# Patient Record
Sex: MALE | Race: WHITE | NOT HISPANIC OR LATINO | Employment: OTHER | URBAN - METROPOLITAN AREA
[De-identification: names, ages, dates, MRNs, and addresses within clinical notes are randomized per-mention and may not be internally consistent; named-entity substitution may affect disease eponyms.]

---

## 2017-01-31 ENCOUNTER — OFFICE VISIT (OUTPATIENT)
Dept: FAMILY MEDICINE | Facility: CLINIC | Age: 57
End: 2017-01-31
Payer: COMMERCIAL

## 2017-01-31 VITALS
TEMPERATURE: 97.6 F | WEIGHT: 186 LBS | HEIGHT: 71 IN | DIASTOLIC BLOOD PRESSURE: 80 MMHG | OXYGEN SATURATION: 99 % | BODY MASS INDEX: 26.04 KG/M2 | SYSTOLIC BLOOD PRESSURE: 134 MMHG | HEART RATE: 76 BPM

## 2017-01-31 DIAGNOSIS — Z01.818 PREOP GENERAL PHYSICAL EXAM: ICD-10-CM

## 2017-01-31 DIAGNOSIS — N17.9 AKI (ACUTE KIDNEY INJURY) (H): ICD-10-CM

## 2017-01-31 DIAGNOSIS — E78.5 HYPERLIPIDEMIA, UNSPECIFIED HYPERLIPIDEMIA TYPE: ICD-10-CM

## 2017-01-31 DIAGNOSIS — N20.0 NEPHROLITHIASIS: ICD-10-CM

## 2017-01-31 DIAGNOSIS — I10 HYPERTENSION GOAL BP (BLOOD PRESSURE) < 140/90: ICD-10-CM

## 2017-01-31 LAB
BASOPHILS # BLD AUTO: 0.1 10E9/L (ref 0–0.2)
BASOPHILS NFR BLD AUTO: 0.5 %
DIFFERENTIAL METHOD BLD: ABNORMAL
EOSINOPHIL # BLD AUTO: 0.2 10E9/L (ref 0–0.7)
EOSINOPHIL NFR BLD AUTO: 2.2 %
ERYTHROCYTE [DISTWIDTH] IN BLOOD BY AUTOMATED COUNT: 12.3 % (ref 10–15)
HCT VFR BLD AUTO: 39.9 % (ref 40–53)
HGB BLD-MCNC: 14.2 G/DL (ref 13.3–17.7)
LYMPHOCYTES # BLD AUTO: 1.9 10E9/L (ref 0.8–5.3)
LYMPHOCYTES NFR BLD AUTO: 18.6 %
MCH RBC QN AUTO: 31.4 PG (ref 26.5–33)
MCHC RBC AUTO-ENTMCNC: 35.6 G/DL (ref 31.5–36.5)
MCV RBC AUTO: 88 FL (ref 78–100)
MONOCYTES # BLD AUTO: 0.9 10E9/L (ref 0–1.3)
MONOCYTES NFR BLD AUTO: 8.8 %
NEUTROPHILS # BLD AUTO: 7.2 10E9/L (ref 1.6–8.3)
NEUTROPHILS NFR BLD AUTO: 69.9 %
PLATELET # BLD AUTO: 313 10E9/L (ref 150–450)
RBC # BLD AUTO: 4.52 10E12/L (ref 4.4–5.9)
WBC # BLD AUTO: 10.2 10E9/L (ref 4–11)

## 2017-01-31 PROCEDURE — 99215 OFFICE O/P EST HI 40 MIN: CPT | Performed by: FAMILY MEDICINE

## 2017-01-31 PROCEDURE — 85025 COMPLETE CBC W/AUTO DIFF WBC: CPT | Performed by: FAMILY MEDICINE

## 2017-01-31 PROCEDURE — 80048 BASIC METABOLIC PNL TOTAL CA: CPT | Performed by: FAMILY MEDICINE

## 2017-01-31 PROCEDURE — 36415 COLL VENOUS BLD VENIPUNCTURE: CPT | Performed by: FAMILY MEDICINE

## 2017-01-31 PROCEDURE — 80061 LIPID PANEL: CPT | Performed by: FAMILY MEDICINE

## 2017-01-31 PROCEDURE — 93000 ELECTROCARDIOGRAM COMPLETE: CPT | Performed by: FAMILY MEDICINE

## 2017-01-31 PROCEDURE — 82043 UR ALBUMIN QUANTITATIVE: CPT | Performed by: FAMILY MEDICINE

## 2017-01-31 RX ORDER — OXYCODONE HYDROCHLORIDE 5 MG/1
10 TABLET ORAL EVERY 6 HOURS PRN
Qty: 18 TABLET | Refills: 0 | COMMUNITY
Start: 2017-01-31 | End: 2018-03-27

## 2017-01-31 RX ORDER — TAMSULOSIN HYDROCHLORIDE 0.4 MG/1
CAPSULE ORAL DAILY
COMMUNITY
End: 2019-09-04

## 2017-01-31 RX ORDER — IBUPROFEN 100 MG/1
100 TABLET, CHEWABLE ORAL EVERY 8 HOURS PRN
COMMUNITY
End: 2018-03-27

## 2017-01-31 NOTE — NURSING NOTE
"Chief Complaint   Patient presents with     Pre-Op Exam       Initial /80 mmHg  Pulse 76  Temp(Src) 97.6  F (36.4  C) (Oral)  Ht 5' 11\" (1.803 m)  Wt 186 lb (84.369 kg)  BMI 25.95 kg/m2  SpO2 99% Estimated body mass index is 25.95 kg/(m^2) as calculated from the following:    Height as of this encounter: 5' 11\" (1.803 m).    Weight as of this encounter: 186 lb (84.369 kg).  BP completed using cuff size: verenice Cordova MA      "

## 2017-01-31 NOTE — PATIENT INSTRUCTIONS
No Advil, Aleve or Aspirin three days before your surgery.  Hold all medications on the day of your surgery.   Before Your Surgery      Call your surgeon if there is any change in your health. This includes signs of a cold or flu (such as a sore throat, runny nose, cough, rash or fever).    Do not smoke, drink alcohol or take over the counter medicine (unless your surgeon or primary care doctor tells you to) for the 24 hours before and after surgery.    If you take prescribed drugs: Follow your doctor s orders about which medicines to take and which to stop until after surgery.    Eating and drinking prior to surgery: follow the instructions from your surgeon    Take a shower or bath the night before surgery. Use the soap your surgeon gave you to gently clean your skin. If you do not have soap from your surgeon, use your regular soap. Do not shave or scrub the surgery site.  Wear clean pajamas and have clean sheets on your bed.

## 2017-01-31 NOTE — MR AVS SNAPSHOT
After Visit Summary   1/31/2017    Sy Suazo    MRN: [id number]           Patient Information     Date Of Birth          1960        Visit Information        Provider Department      1/31/2017 2:40 PM Christine Medina MD [facility] [place]        Today's Diagnoses     Preop general physical exam         Nephrolithiasis         Hypertension goal BP (blood pressure) < 140/90         Hyperlipidemia, unspecified hyperlipidemia type         Need for hepatitis C screening test           Care Instructions    No Advil, Aleve or Aspirin three days before your surgery.  Hold all medications on the day of your surgery.   Before Your Surgery      Call your surgeon if there is any change in your health. This includes signs of a cold or flu (such as a sore throat, runny nose, cough, rash or fever).    Do not smoke, drink alcohol or take over the counter medicine (unless your surgeon or primary care doctor tells you to) for the 24 hours before and after surgery.    If you take prescribed drugs: Follow your doctor s orders about which medicines to take and which to stop until after surgery.    Eating and drinking prior to surgery: follow the instructions from your surgeon    Take a shower or bath the night before surgery. Use the soap your surgeon gave you to gently clean your skin. If you do not have soap from your surgeon, use your regular soap. Do not shave or scrub the surgery site.  Wear clean pajamas and have clean sheets on your bed.         Follow-ups after your visit        Who to contact     If you have questions or need follow up information about today's clinic visit or your schedule please contact [facility] directly at [phone].  Normal or non-critical lab and imaging results will be communicated to you by MyChart, letter or phone within 4 business days after the clinic has received the results. If you do not hear from us within 7 days, please contact the clinic  "through [employer] or phone. If you have a critical or abnormal lab result, we will notify you by phone as soon as possible.  Submit refill requests through [employer] or call your pharmacy and they will forward the refill request to us. Please allow 3 business days for your refill to be completed.          Additional Information About Your Visit        [employer] Information     [employer] gives you secure access to your electronic health record. If you see a primary care provider, you can also send messages to your care team and make appointments. If you have questions, please call your primary care clinic.  If you do not have a primary care provider, please call [phone] and they will assist you.        Care EveryWhere ID     This is your Care EveryWhere ID. This could be used by other organizations to access your [place] medical records  [id number]        Your Vitals Were     Pulse Temperature Height BMI (Body Mass Index) Pulse Oximetry       76 97.6  F (36.4  C) (Oral) 5' 11\" (1.803 m) 25.95 kg/m2 99%        Blood Pressure from Last 3 Encounters:   01/31/17 134/80   03/04/16 124/77   02/05/16 121/80    Weight from Last 3 Encounters:   01/31/17 186 lb (84.369 kg)   03/04/16 188 lb (85.276 kg)   02/05/16 187 lb 8 oz (85.049 kg)              We Performed the Following     Albumin Random Urine Quantitative     Basic metabolic panel  (Ca, Cl, CO2, Creat, Gluc, K, Na, BUN)     CBC with platelets and differential     EKG 12-lead complete w/read - Clinics     Hepatitis C Screen Reflex to HCV RNA Quant and Genotype     Lipid Profile (Chol, Trig, HDL, LDL calc)        Primary Care Provider Office Phone # Fax #    Deqa Carie Medina [phone]       [facility] [address]  [facility] [address]        Thank you!     Thank you for choosing [facility]  for your care. Our goal is always to provide you with excellent care. Hearing back from our patients is one way we can " continue to improve our services. Please take a few minutes to complete the written survey that you may receive in the mail after your visit with us. Thank you!             Your Updated Medication List - Protect others around you: Learn how to safely use, store and throw away your medicines at www.disposemymeds.org.          This list is accurate as of: 1/31/17  3:41 PM.  Always use your most recent med list.                   Brand Name Dispense Instructions for use    FLOMAX 0.4 MG capsule   Generic drug:  tamsulosin      Take by mouth daily       ibuprofen 100 MG chewable tablet    ADVIL/MOTRIN     Take 100 mg by mouth every 8 hours as needed for fever       oxyCODONE 5 MG IR tablet    ROXICODONE    18 tablet    Take 2 tablets (10 mg) by mouth every 6 hours as needed for pain
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
## 2017-02-01 LAB
ANION GAP SERPL CALCULATED.3IONS-SCNC: 8 MMOL/L (ref 3–14)
BUN SERPL-MCNC: 30 MG/DL (ref 7–30)
CALCIUM SERPL-MCNC: 9.2 MG/DL (ref 8.5–10.1)
CHLORIDE SERPL-SCNC: 107 MMOL/L (ref 94–109)
CHOLEST SERPL-MCNC: 132 MG/DL
CO2 SERPL-SCNC: 24 MMOL/L (ref 20–32)
CREAT SERPL-MCNC: 1.82 MG/DL (ref 0.66–1.25)
CREAT UR-MCNC: 154 MG/DL
GFR SERPL CREATININE-BSD FRML MDRD: 39 ML/MIN/1.7M2
GLUCOSE SERPL-MCNC: 90 MG/DL (ref 70–99)
HDLC SERPL-MCNC: 35 MG/DL
LDLC SERPL CALC-MCNC: 72 MG/DL
MICROALBUMIN UR-MCNC: 20 MG/L
MICROALBUMIN/CREAT UR: 12.92 MG/G CR (ref 0–17)
NONHDLC SERPL-MCNC: 97 MG/DL
POTASSIUM SERPL-SCNC: 4.3 MMOL/L (ref 3.4–5.3)
SODIUM SERPL-SCNC: 139 MMOL/L (ref 133–144)
TRIGL SERPL-MCNC: 123 MG/DL

## 2018-03-27 ENCOUNTER — OFFICE VISIT (OUTPATIENT)
Dept: FAMILY MEDICINE | Facility: CLINIC | Age: 58
End: 2018-03-27
Payer: COMMERCIAL

## 2018-03-27 VITALS
RESPIRATION RATE: 20 BRPM | HEART RATE: 82 BPM | OXYGEN SATURATION: 96 % | DIASTOLIC BLOOD PRESSURE: 84 MMHG | SYSTOLIC BLOOD PRESSURE: 136 MMHG | BODY MASS INDEX: 26.81 KG/M2 | HEIGHT: 71 IN | TEMPERATURE: 99.1 F | WEIGHT: 191.5 LBS

## 2018-03-27 DIAGNOSIS — R10.32 LLQ ABDOMINAL PAIN: Primary | ICD-10-CM

## 2018-03-27 DIAGNOSIS — R19.5 LOOSE STOOLS: ICD-10-CM

## 2018-03-27 DIAGNOSIS — Z12.11 SCREENING FOR COLON CANCER: ICD-10-CM

## 2018-03-27 DIAGNOSIS — K21.9 GASTROESOPHAGEAL REFLUX DISEASE WITHOUT ESOPHAGITIS: ICD-10-CM

## 2018-03-27 DIAGNOSIS — Z13.220 SCREENING FOR LIPID DISORDERS: ICD-10-CM

## 2018-03-27 DIAGNOSIS — K59.00 CONSTIPATION, UNSPECIFIED CONSTIPATION TYPE: ICD-10-CM

## 2018-03-27 LAB
ERYTHROCYTE [DISTWIDTH] IN BLOOD BY AUTOMATED COUNT: 12.6 % (ref 10–15)
HCT VFR BLD AUTO: 47.5 % (ref 40–53)
HGB BLD-MCNC: 17.2 G/DL (ref 13.3–17.7)
MCH RBC QN AUTO: 32 PG (ref 26.5–33)
MCHC RBC AUTO-ENTMCNC: 36.2 G/DL (ref 31.5–36.5)
MCV RBC AUTO: 88 FL (ref 78–100)
PLATELET # BLD AUTO: 226 10E9/L (ref 150–450)
RBC # BLD AUTO: 5.38 10E12/L (ref 4.4–5.9)
WBC # BLD AUTO: 6.3 10E9/L (ref 4–11)

## 2018-03-27 PROCEDURE — 99214 OFFICE O/P EST MOD 30 MIN: CPT | Performed by: FAMILY MEDICINE

## 2018-03-27 PROCEDURE — 36415 COLL VENOUS BLD VENIPUNCTURE: CPT | Performed by: FAMILY MEDICINE

## 2018-03-27 PROCEDURE — 80053 COMPREHEN METABOLIC PANEL: CPT | Performed by: FAMILY MEDICINE

## 2018-03-27 PROCEDURE — 84443 ASSAY THYROID STIM HORMONE: CPT | Performed by: FAMILY MEDICINE

## 2018-03-27 PROCEDURE — 85027 COMPLETE CBC AUTOMATED: CPT | Performed by: FAMILY MEDICINE

## 2018-03-27 PROCEDURE — 80061 LIPID PANEL: CPT | Performed by: FAMILY MEDICINE

## 2018-03-27 NOTE — MR AVS SNAPSHOT
After Visit Summary   3/27/2018    Sy Suazo    MRN: [id number]           Patient Information     Date Of Birth          1960        Visit Information        Provider Department      3/27/2018 1:00 PM Christine Medina MD [facility]        Today's Diagnoses     LLQ abdominal pain    -  1    Constipation, unspecified constipation type        Loose stools        Screening for colon cancer        Screening for lipid disorders        Gastroesophageal reflux disease without esophagitis           Follow-ups after your visit        Future tests that were ordered for you today     Open Future Orders        Priority Expected Expires Ordered    Fecal colorectal cancer screen (FIT) Routine 4/17/2018 6/19/2018 3/27/2018    H Pylori antigen, stool Routine  4/26/2018 3/27/2018            Who to contact     If you have questions or need follow up information about today's clinic visit or your schedule please contact [facility] directly at [phone].  Normal or non-critical lab and imaging results will be communicated to you by MyChart, letter or phone within 4 business days after the clinic has received the results. If you do not hear from us within 7 days, please contact the clinic through [employer] or phone. If you have a critical or abnormal lab result, we will notify you by phone as soon as possible.  Submit refill requests through [employer] or call your pharmacy and they will forward the refill request to us. Please allow 3 business days for your refill to be completed.          Additional Information About Your Visit        MyChart Information     [employer] gives you secure access to your electronic health record. If you see a primary care provider, you can also send messages to your care team and make appointments. If you have questions, please call your primary care clinic.  If you do not have a primary care provider, please call [phone] and they will assist  "you.        Care EveryWhere ID     This is your Care EveryWhere ID. This could be used by other organizations to access your [place] medical records  [id number]        Your Vitals Were     Pulse Temperature Respirations Height Pulse Oximetry BMI (Body Mass Index)    82 99.1  F (37.3  C) (Oral) 20 5' 11\" (1.803 m) 96% 26.71 kg/m2       Blood Pressure from Last 3 Encounters:   03/27/18 136/84   01/31/17 134/80   03/04/16 124/77    Weight from Last 3 Encounters:   03/27/18 191 lb 8 oz (86.9 kg)   01/31/17 186 lb (84.4 kg)   03/04/16 188 lb (85.3 kg)              We Performed the Following     CBC with platelets     Comprehensive metabolic panel (BMP + Alb, Alk Phos, ALT, AST, Total. Bili, TP)     Lipid Profile (Chol, Trig, HDL, LDL calc)     TSH with free T4 reflex        Primary Care Provider Office Phone # Fax #    Deqa Carie Medina [phone] [address] [place] [phone]        Equal Access to Services     [facility]: Hadii aad ku hadasho Sotianaali, waaxda luqadaha, qaybta kaalmada adekrissy, trey swartz . So [facility] [phone].    ATENCIÓN: Si habla español, tiene a tapia disposición servicios gratuitos de asistencia lingüística. [facility] [phone].    We comply with applicable federal civil rights laws and [place] laws. We do not discriminate on the basis of race, color, national origin, age, disability, sex, sexual orientation, or gender identity.            Thank you!     Thank you for choosing [facility]  for your care. Our goal is always to provide you with excellent care. Hearing back from our patients is one way we can continue to improve our services. Please take a few minutes to complete the written survey that you may receive in the mail after your visit with us. Thank you!             Your Updated Medication List - Protect others around you: Learn how to safely use, store and throw away your medicines at " www.disposemymeds.org.          This list is accurate as of 3/27/18  2:06 PM.  Always use your most recent med list.                   Brand Name Dispense Instructions for use Diagnosis    FLOMAX 0.4 MG capsule   Generic drug:  tamsulosin      Take by mouth daily

## 2018-03-27 NOTE — PROGRESS NOTES
"  SUBJECTIVE:   Sy Suazo is a 57 year old male who presents to clinic today for the following health issues:      Gastrointestinal symptoms      Duration: 1 month    Description:  Left lower quadrant, constant, 2/10    Accompanying signs and symptoms:  Alternating mild constipation and diarrhea, initially gurgling which has improved    History  Previous similar problem: no   Previous evaluation:  none    Aggravating factors: none    Alleviating factors: nothing    Other Therapies tried: None    Pt has severe GERD - Worsening symptoms over the last month. Pt isn't taking OTC medication. Pt has modified his diet to help with symptoms.  No fever, chills, nausea, vomiting, appetite changes or unintentional weight loss.  No recent travel.   No chronic NSAIDs.  No smoking.   Negative FIT test in 2016.         Problem list and histories reviewed & adjusted, as indicated.  Additional history: as documented    Labs reviewed in EPIC    Reviewed and updated as needed this visit by clinical staff  Tobacco  Allergies  Meds  Med Hx  Surg Hx  Fam Hx  Soc Hx      Reviewed and updated as needed this visit by Provider         ROS:  Constitutional, HEENT, cardiovascular, pulmonary, gi and gu systems are negative, except as otherwise noted.    OBJECTIVE:     /84 (Cuff Size: Adult Large)  Pulse 82  Temp 99.1  F (37.3  C) (Oral)  Resp 20  Ht 5' 11\" (1.803 m)  Wt 191 lb 8 oz (86.9 kg)  SpO2 96%  BMI 26.71 kg/m2  Body mass index is 26.71 kg/(m^2).  GENERAL: healthy, alert and no distress  EYES: Eyes grossly normal to inspection  HENT:nose and mouth without ulcers or lesions  ABDOMEN: soft, + mild LLQ tender, no hepatosplenomegaly, no masses and bowel sounds normal   (male): normal male genitalia without lesions or urethral discharge, no hernia    Diagnostic Test Results:  none     ASSESSMENT/PLAN:     ##  LLQ abdominal pain: a/s with constipation and loose stools  - d/d diverticulosis vs thyroid etiology vs " h.pyolri; unlikely inguinal hernia (normal exam) vs kidney stones   - CBC with platelets  - Comprehensive metabolic panel (BMP + Alb, Alk Phos, ALT, AST, Total. Bili, TP)  - H Pylori antigen, stool; Future  - TSH with free T4 reflex  - if kidney functions improved then will order CT scan for further evaluation     ## Screening for colon cancer  - Fecal colorectal cancer screen (FIT); Future    ## Screening for lipid disorders  - Lipid Profile (Chol, Trig, HDL, LDL calc)  The 10-year ASCVD risk score (Catlin DC Jr, et al., 2013) is: 7.6%    Values used to calculate the score:      Age: 57 years      Sex: Male      Is Non- : No      Diabetic: No      Tobacco smoker: No      Systolic Blood Pressure: 136 mmHg      Is BP treated: No      HDL Cholesterol: 35 mg/dL      Total Cholesterol: 182 mg/dL    ## GERD:   - pt not interested in medication     Christine Medina MD  [facility]

## 2018-03-28 DIAGNOSIS — R10.32 LLQ ABDOMINAL PAIN: ICD-10-CM

## 2018-03-28 LAB
ALBUMIN SERPL-MCNC: 4.3 G/DL (ref 3.4–5)
ALP SERPL-CCNC: 96 U/L (ref 40–150)
ALT SERPL W P-5'-P-CCNC: 40 U/L (ref 0–70)
ANION GAP SERPL CALCULATED.3IONS-SCNC: 6 MMOL/L (ref 3–14)
AST SERPL W P-5'-P-CCNC: 31 U/L (ref 0–45)
BILIRUB SERPL-MCNC: 0.7 MG/DL (ref 0.2–1.3)
BUN SERPL-MCNC: 14 MG/DL (ref 7–30)
CALCIUM SERPL-MCNC: 9.2 MG/DL (ref 8.5–10.1)
CHLORIDE SERPL-SCNC: 107 MMOL/L (ref 94–109)
CHOLEST SERPL-MCNC: 182 MG/DL
CO2 SERPL-SCNC: 28 MMOL/L (ref 20–32)
CREAT SERPL-MCNC: 1.2 MG/DL (ref 0.66–1.25)
GFR SERPL CREATININE-BSD FRML MDRD: 63 ML/MIN/1.7M2
GLUCOSE SERPL-MCNC: 69 MG/DL (ref 70–99)
HDLC SERPL-MCNC: 35 MG/DL
LDLC SERPL CALC-MCNC: 75 MG/DL
NONHDLC SERPL-MCNC: 147 MG/DL
POTASSIUM SERPL-SCNC: 3.5 MMOL/L (ref 3.4–5.3)
PROT SERPL-MCNC: 8 G/DL (ref 6.8–8.8)
SODIUM SERPL-SCNC: 141 MMOL/L (ref 133–144)
TRIGL SERPL-MCNC: 362 MG/DL
TSH SERPL DL<=0.005 MIU/L-ACNC: 0.68 MU/L (ref 0.4–4)

## 2018-03-28 PROCEDURE — 82274 ASSAY TEST FOR BLOOD FECAL: CPT | Performed by: FAMILY MEDICINE

## 2018-03-28 PROCEDURE — 87338 HPYLORI STOOL AG IA: CPT | Performed by: FAMILY MEDICINE

## 2018-03-29 LAB
H PYLORI AG STL QL IA: NORMAL
SPECIMEN SOURCE: NORMAL

## 2018-03-31 LAB — HEMOCCULT STL QL IA: NEGATIVE

## 2018-04-02 DIAGNOSIS — Z12.11 SCREENING FOR COLON CANCER: ICD-10-CM

## 2018-04-14 ENCOUNTER — MYC MEDICAL ADVICE (OUTPATIENT)
Dept: FAMILY MEDICINE | Facility: CLINIC | Age: 58
End: 2018-04-14

## 2018-04-16 NOTE — TELEPHONE ENCOUNTER
Writer responded as per below.    Dr. Medina-Please review and may close encounter.    Thank you!  ARELY ChinoN, RN

## 2019-09-04 ENCOUNTER — OFFICE VISIT (OUTPATIENT)
Dept: FAMILY MEDICINE | Facility: CLINIC | Age: 59
End: 2019-09-04
Payer: COMMERCIAL

## 2019-09-04 VITALS
DIASTOLIC BLOOD PRESSURE: 74 MMHG | WEIGHT: 192.5 LBS | HEIGHT: 70 IN | TEMPERATURE: 98.1 F | HEART RATE: 60 BPM | SYSTOLIC BLOOD PRESSURE: 136 MMHG | RESPIRATION RATE: 16 BRPM | OXYGEN SATURATION: 97 % | BODY MASS INDEX: 27.56 KG/M2

## 2019-09-04 DIAGNOSIS — R35.0 URINARY FREQUENCY: Primary | ICD-10-CM

## 2019-09-04 DIAGNOSIS — Z12.11 COLON CANCER SCREENING: ICD-10-CM

## 2019-09-04 DIAGNOSIS — Z11.59 NEED FOR HEPATITIS C SCREENING TEST: ICD-10-CM

## 2019-09-04 DIAGNOSIS — Z53.20 HIV SCREENING DECLINED: ICD-10-CM

## 2019-09-04 DIAGNOSIS — Z12.5 SCREENING FOR PROSTATE CANCER: ICD-10-CM

## 2019-09-04 LAB
ALBUMIN UR-MCNC: NEGATIVE MG/DL
APPEARANCE UR: CLEAR
BILIRUB UR QL STRIP: NEGATIVE
COLOR UR AUTO: YELLOW
GLUCOSE UR STRIP-MCNC: NEGATIVE MG/DL
HGB UR QL STRIP: NEGATIVE
KETONES UR STRIP-MCNC: NEGATIVE MG/DL
LEUKOCYTE ESTERASE UR QL STRIP: NEGATIVE
NITRATE UR QL: NEGATIVE
PH UR STRIP: 5 PH (ref 5–7)
SOURCE: NORMAL
SP GR UR STRIP: 1.02 (ref 1–1.03)
UROBILINOGEN UR STRIP-ACNC: 0.2 EU/DL (ref 0.2–1)

## 2019-09-04 PROCEDURE — 99214 OFFICE O/P EST MOD 30 MIN: CPT | Performed by: FAMILY MEDICINE

## 2019-09-04 PROCEDURE — 86803 HEPATITIS C AB TEST: CPT | Performed by: FAMILY MEDICINE

## 2019-09-04 PROCEDURE — G0103 PSA SCREENING: HCPCS | Performed by: FAMILY MEDICINE

## 2019-09-04 PROCEDURE — 81003 URINALYSIS AUTO W/O SCOPE: CPT | Performed by: FAMILY MEDICINE

## 2019-09-04 PROCEDURE — 36415 COLL VENOUS BLD VENIPUNCTURE: CPT | Performed by: FAMILY MEDICINE

## 2019-09-04 ASSESSMENT — MIFFLIN-ST. JEOR: SCORE: 1713.39

## 2019-09-04 NOTE — PROGRESS NOTES
"Subjective     Sy Suazo is a 58 year old male who presents to clinic today for the following health issues:    HPI     Around two weeks ago he was having a prostate problem. He was going to the bathroom frequently. He was up a lot at night. He then started getting pain in his groin area. At the same time the prostate problem improved. Then within 24-48 hours, the pain improved. He made the appointment during the time that it was bothering him.  No current abdominal pain, groin pain, dysuria, hematuria, urinary frequency, rashes, fever or chills.          Reviewed and updated as needed this visit by Provider         Review of Systems   ROS COMP: Constitutional, HEENT, cardiovascular, pulmonary, gi and gu systems are negative, except as otherwise noted.      Objective    There were no vitals taken for this visit.  There is no height or weight on file to calculate BMI.  Physical Exam   /74 (BP Location: Left arm, Patient Position: Sitting, Cuff Size: Adult Regular)   Pulse 60   Temp 98.1  F (36.7  C) (Oral)   Resp 16   Ht 1.784 m (5' 10.25\")   Wt 87.3 kg (192 lb 8 oz)   SpO2 97%   BMI 27.42 kg/m    GENERAL: healthy, alert and no distress  EYES: Eyes grossly normal to inspection  HENT: nose and mouth without ulcers or lesions  : declined   MS: no gross musculoskeletal defects noted    Diagnostic Test Results:  none         Assessment & Plan   American Urological Association Symptom Score  1. Urinary frequency  - d/d UTI vs prostatitis vs BPH; unlikely hernia vs kidney stones  - ordered below for further evaluation; tx as indicated   0-7 Points Enlarged Prostate Symptoms, also known as BPH Symptoms, are considered mild  - *UA reflex to Microscopic and Culture ([facility] (except [place] and [place])  - declined trial of flomax for BPH    2. Colon cancer screening  - Fecal colorectal cancer screen (FIT); Future    3. Screening for prostate cancer  - PSA, screen    4. Need for " hepatitis C screening test  - Hepatitis C Screen Reflex to HCV RNA Quant and Genotype    5. HIV screening declined    No follow-ups on file.    Christine Medina MD  [facility]

## 2019-09-05 DIAGNOSIS — Z12.11 COLON CANCER SCREENING: ICD-10-CM

## 2019-09-05 LAB
HCV AB SERPL QL IA: NONREACTIVE
PSA SERPL-ACNC: 1.74 UG/L (ref 0–4)

## 2019-09-05 PROCEDURE — 82274 ASSAY TEST FOR BLOOD FECAL: CPT | Performed by: FAMILY MEDICINE

## 2019-09-08 LAB — HEMOCCULT STL QL IA: NEGATIVE

## 2019-11-03 ENCOUNTER — HEALTH MAINTENANCE LETTER (OUTPATIENT)
Age: 59
End: 2019-11-03

## 2020-04-30 ENCOUNTER — TELEPHONE (OUTPATIENT)
Dept: ORTHOPEDICS | Facility: CLINIC | Age: 60
End: 2020-04-30

## 2020-04-30 ENCOUNTER — VIRTUAL VISIT (OUTPATIENT)
Dept: ORTHOPEDICS | Facility: CLINIC | Age: 60
End: 2020-04-30
Payer: COMMERCIAL

## 2020-04-30 DIAGNOSIS — M25.561 PATELLOFEMORAL ARTHRALGIA OF RIGHT KNEE: Primary | ICD-10-CM

## 2020-04-30 NOTE — PROGRESS NOTES
"Sy Suazo is a 59 year old male who is being evaluated via a billable video visit.      The patient has been notified of following:     \"This video visit will be conducted via a call between you and your physician/provider. We have found that certain health care needs can be provided without the need for an in-person physical exam.  This service lets us provide the care you need with a video conversation.  If a prescription is necessary we can send it directly to your pharmacy.  If lab work is needed we can place an order for that and you can then stop by our lab to have the test done at a later time.    Video visits are billed at different rates depending on your insurance coverage.  Please reach out to your insurance provider with any questions.    If during the course of the call the physician/provider feels a video visit is not appropriate, you will not be charged for this service.\"    Patient has given verbal consent for Video visit? Yes    How would you like to obtain your AVS? Darrel    Patient would like the video invitation sent by: Other e-mail: [email or web address]    Will anyone else be joining your video visit? No    Developed right knee pain on Sunday when he was riding his stationary bike.  The pain progressed on his ride so he decided to stop.  Pain at that time was located mid patella and described as sharp.  No JOSTIN or previous injuries.  Pain was sharp that evening.  Believe that he had some minor swelling of his right knee when compared to his left.  This swelling has since subsided.      Today he just describes a generalized soreness and rates it as a 1/10.  There is no bruising.  The most concerning thing for him is that his knee feels weak.  He denies any mechanical symptoms.  He has been very hesitant to continue to exercise this week, and has not biked at all.  He is taking each step one at time for fear of his knee giving out on him.  No hip, calf or foot pain.  He describes chronic " numbness and tingling into both LE, but this is unchanged from baseline.  Has not used any ice or NSAIDs.     He normally bikes or walks daily.  He has had issues with his left knee and had an MRI of this one over 20 years ago.    Given inability to get the video camera to work we are not able to do a significant exam.  He explains that he has FROM without pain.  There is no swelling per his report.    A/P: Sy Suazo presents with right knee pain since Sunday that is improving with limiting his activities.  He is most concerned about his subjective weakness.  No JOSTIN or other incident to suggest any ligamentous damage.  Based on history and onset of his symptoms, this sounds like patellofemoral pain, possibly exacerbated by some degeneration under his knee cap.  This was discussed with him.  Nothing based on our conversation suggests the need for imaging at this time.    -No biking for the next 1 week  -Okay to walk as tolerated  -MyChart message if he is still struggling in 2-3 weeks to consider in patient visit and XR    Video-Visit Details    Type of service:  Video Visit    Video Start Time: 1:20 PM  Video End Time: 1:46 PM    Originating Location (pt. Location): Home    Distant Location (provider location):  [facility]     Platform used for Video Visit: Unable to complete video visit -- Used [employer] but unable to get video camera to work.    Dr. Fishman was on the visit was well.    Yamilka Yousif DO  Primary Care Sports Medicine Fellow

## 2020-04-30 NOTE — TELEPHONE ENCOUNTER
Patient reports onset of right anterior knee pain since Sunday, 4/26. He states that he experienced soreness using a stationary bike, and the soreness last 2 days. Now reports weakness in his knee, and giving away episodes. He states that he is able to walk. He denies any redness or swelling. After speaking with Dr. Ortiz, the patient was offered a video visit to evaluate the knee pain. The patient will do this via computer. He provided the email address to send the link to. He was made aware that someone will reach out prior to the visit to check him in. I will send him a link via [employer] for a video visit tip sheet for his review. He has no further questions.

## 2020-05-04 NOTE — PROGRESS NOTES
Attending Note:   I have talked with this patient along with Dr. Yousif via video visit and have reviewed the clinical presentation and watched the video examination with the fellow. I agree with the treatment plan as outlined. The plan was formulated with the fellow on the day of the patient's visit.   Beth Fishman MD, CAQ, CCD  [facility]  Sports Medicine and Bone Health

## 2020-11-16 ENCOUNTER — HEALTH MAINTENANCE LETTER (OUTPATIENT)
Age: 60
End: 2020-11-16

## 2021-09-18 ENCOUNTER — HEALTH MAINTENANCE LETTER (OUTPATIENT)
Age: 61
End: 2021-09-18

## 2022-01-08 ENCOUNTER — HEALTH MAINTENANCE LETTER (OUTPATIENT)
Age: 62
End: 2022-01-08

## 2022-11-20 ENCOUNTER — HEALTH MAINTENANCE LETTER (OUTPATIENT)
Age: 62
End: 2022-11-20

## 2023-04-15 ENCOUNTER — HEALTH MAINTENANCE LETTER (OUTPATIENT)
Age: 63
End: 2023-04-15

## 2023-11-13 ENCOUNTER — OFFICE VISIT (OUTPATIENT)
Dept: FAMILY MEDICINE | Facility: CLINIC | Age: 63
End: 2023-11-13
Payer: COMMERCIAL

## 2023-11-13 ENCOUNTER — LAB (OUTPATIENT)
Dept: FAMILY MEDICINE | Facility: CLINIC | Age: 63
End: 2023-11-13

## 2023-11-13 VITALS
BODY MASS INDEX: 24.78 KG/M2 | WEIGHT: 177 LBS | OXYGEN SATURATION: 96 % | SYSTOLIC BLOOD PRESSURE: 132 MMHG | HEART RATE: 99 BPM | RESPIRATION RATE: 16 BRPM | DIASTOLIC BLOOD PRESSURE: 83 MMHG | HEIGHT: 71 IN | TEMPERATURE: 97.5 F

## 2023-11-13 DIAGNOSIS — Z12.11 SCREEN FOR COLON CANCER: ICD-10-CM

## 2023-11-13 DIAGNOSIS — Z11.4 SCREENING FOR HIV (HUMAN IMMUNODEFICIENCY VIRUS): ICD-10-CM

## 2023-11-13 DIAGNOSIS — I10 HYPERTENSION GOAL BP (BLOOD PRESSURE) < 140/90: ICD-10-CM

## 2023-11-13 DIAGNOSIS — Z00.00 ROUTINE GENERAL MEDICAL EXAMINATION AT A HEALTH CARE FACILITY: ICD-10-CM

## 2023-11-13 DIAGNOSIS — Z13.220 LIPID SCREENING: ICD-10-CM

## 2023-11-13 DIAGNOSIS — Z12.83 SKIN CANCER SCREENING: ICD-10-CM

## 2023-11-13 DIAGNOSIS — Z12.5 SCREENING FOR PROSTATE CANCER: ICD-10-CM

## 2023-11-13 DIAGNOSIS — R10.32 LLQ ABDOMINAL PAIN: ICD-10-CM

## 2023-11-13 DIAGNOSIS — Z23 NEED FOR VACCINATION: ICD-10-CM

## 2023-11-13 LAB
ERYTHROCYTE [DISTWIDTH] IN BLOOD BY AUTOMATED COUNT: 11.8 % (ref 10–15)
HCT VFR BLD AUTO: 47.8 % (ref 40–53)
HGB BLD-MCNC: 17.1 G/DL (ref 13.3–17.7)
MCH RBC QN AUTO: 31.6 PG (ref 26.5–33)
MCHC RBC AUTO-ENTMCNC: 35.8 G/DL (ref 31.5–36.5)
MCV RBC AUTO: 88 FL (ref 78–100)
PLATELET # BLD AUTO: 209 10E3/UL (ref 150–450)
RBC # BLD AUTO: 5.41 10E6/UL (ref 4.4–5.9)
WBC # BLD AUTO: 6 10E3/UL (ref 4–11)

## 2023-11-13 PROCEDURE — 90678 RSV VACC PREF BIVALENT IM: CPT | Performed by: FAMILY MEDICINE

## 2023-11-13 PROCEDURE — 80053 COMPREHEN METABOLIC PANEL: CPT | Performed by: FAMILY MEDICINE

## 2023-11-13 PROCEDURE — 91320 SARSCV2 VAC 30MCG TRS-SUC IM: CPT | Performed by: FAMILY MEDICINE

## 2023-11-13 PROCEDURE — 85027 COMPLETE CBC AUTOMATED: CPT | Performed by: FAMILY MEDICINE

## 2023-11-13 PROCEDURE — 82043 UR ALBUMIN QUANTITATIVE: CPT | Performed by: FAMILY MEDICINE

## 2023-11-13 PROCEDURE — 36415 COLL VENOUS BLD VENIPUNCTURE: CPT | Performed by: FAMILY MEDICINE

## 2023-11-13 PROCEDURE — G0103 PSA SCREENING: HCPCS | Performed by: FAMILY MEDICINE

## 2023-11-13 PROCEDURE — 82570 ASSAY OF URINE CREATININE: CPT | Performed by: FAMILY MEDICINE

## 2023-11-13 PROCEDURE — 99213 OFFICE O/P EST LOW 20 MIN: CPT | Mod: 25 | Performed by: FAMILY MEDICINE

## 2023-11-13 PROCEDURE — 90750 HZV VACC RECOMBINANT IM: CPT | Performed by: FAMILY MEDICINE

## 2023-11-13 PROCEDURE — 90472 IMMUNIZATION ADMIN EACH ADD: CPT | Performed by: FAMILY MEDICINE

## 2023-11-13 PROCEDURE — 96372 THER/PROPH/DIAG INJ SC/IM: CPT | Performed by: FAMILY MEDICINE

## 2023-11-13 PROCEDURE — 80061 LIPID PANEL: CPT | Performed by: FAMILY MEDICINE

## 2023-11-13 PROCEDURE — 90682 RIV4 VACC RECOMBINANT DNA IM: CPT | Performed by: FAMILY MEDICINE

## 2023-11-13 PROCEDURE — 99396 PREV VISIT EST AGE 40-64: CPT | Mod: 25 | Performed by: FAMILY MEDICINE

## 2023-11-13 PROCEDURE — 90471 IMMUNIZATION ADMIN: CPT | Performed by: FAMILY MEDICINE

## 2023-11-13 PROCEDURE — 90480 ADMN SARSCOV2 VAC 1/ONLY CMP: CPT | Performed by: FAMILY MEDICINE

## 2023-11-13 ASSESSMENT — ENCOUNTER SYMPTOMS
ABDOMINAL PAIN: 1
FEVER: 0
COUGH: 0
CHILLS: 0
HEMATURIA: 0
ARTHRALGIAS: 0
MYALGIAS: 0
DIARRHEA: 0
SHORTNESS OF BREATH: 0
JOINT SWELLING: 0
CONSTIPATION: 0
HEMATOCHEZIA: 0
DYSURIA: 0
HEARTBURN: 0
SORE THROAT: 0
FREQUENCY: 0
PARESTHESIAS: 0
HEADACHES: 0
EYE PAIN: 0
WEAKNESS: 0
DIZZINESS: 0
NAUSEA: 0
PALPITATIONS: 0
NERVOUS/ANXIOUS: 0

## 2023-11-13 NOTE — PROGRESS NOTES
SUBJECTIVE:   CC: Aden is an 62 year old who presents for preventative health visit.       11/13/2023    12:52 PM   Additional Questions   Roomed by Reny   Accompanied by Self       Healthy Habits:     Getting at least 3 servings of Calcium per day:  Yes    Bi-annual eye exam:  Yes    Dental care twice a year:  NO    Sleep apnea or symptoms of sleep apnea:  None    Diet:  Regular (no restrictions)    Frequency of exercise:  6-7 days/week    Duration of exercise:  Greater than 60 minutes    Taking medications regularly:  Yes    Medication side effects:  None    Additional concerns today:  No      Today's PHQ-2 Score:       11/13/2023     9:22 AM   PHQ-2 ( 1999 [employer])   Q1: Little interest or pleasure in doing things 0   Q2: Feeling down, depressed or hopeless 0   PHQ-2 Score 0   Q1: Little interest or pleasure in doing things Not at all   Q2: Feeling down, depressed or hopeless Not at all   PHQ-2 Score 0       Have you ever done Advance Care Planning? (For example, a Health Directive, POLST, or a discussion with a medical provider or your loved ones about your wishes): No, advance care planning information given to patient to review.  Patient declined advance care planning discussion at this time.    Social History     Tobacco Use    Smoking status: Never    Smokeless tobacco: Never   Substance Use Topics    Alcohol use: Yes     Comment: 1 drink a month             11/13/2023     9:22 AM   Alcohol Use   Prescreen: >3 drinks/day or >7 drinks/week? No       Last PSA:   PSA   Date Value Ref Range Status   09/04/2019 1.74 0 - 4 ug/L Final     Comment:     Assay Method:  Chemiluminescence using Siemens Vista analyzer       Reviewed orders with patient. Reviewed health maintenance and updated orders accordingly - Yes      Reviewed and updated as needed this visit by clinical staff   Tobacco  Allergies  Meds              Reviewed and updated as needed this visit by Provider                   Review of Systems  "  Constitutional:  Negative for chills and fever.   HENT:  Negative for congestion, ear pain, hearing loss and sore throat.    Eyes:  Negative for pain and visual disturbance.   Respiratory:  Negative for cough and shortness of breath.    Cardiovascular:  Negative for chest pain, palpitations and peripheral edema.   Gastrointestinal:  Positive for abdominal pain. Negative for constipation, diarrhea, heartburn, hematochezia and nausea.   Genitourinary:  Negative for dysuria, frequency, genital sores, hematuria, impotence, penile discharge and urgency.   Musculoskeletal:  Negative for arthralgias, joint swelling and myalgias.   Skin:  Negative for rash.   Neurological:  Negative for dizziness, weakness, headaches and paresthesias.   Psychiatric/Behavioral:  Negative for mood changes. The patient is not nervous/anxious.      Abdominal pain in the past - He was bending over the same side 100 X per day, that went away after he stopped bending over.     He is fairly sure he has small in the left groin, it comes and goes. It is never bad. Most of the time he notes pain is 1/10 and keeps coming back. No history of abdominal surgeries. No constipation or history of heavy lifting.     OBJECTIVE:   /83 (BP Location: Right arm, Patient Position: Sitting, Cuff Size: Adult Regular)   Pulse 99   Temp 97.5  F (36.4  C) (Temporal)   Resp 16   Ht 1.8 m (5' 10.87\")   Wt 80.3 kg (177 lb)   SpO2 96%   BMI 24.78 kg/m      Physical Exam  GENERAL: healthy, alert and no distress  EYES: Eyes grossly normal to inspection  HENT: ear canals and TM's normal  NECK: no adenopathy, no asymmetry, masses, or scars and thyroid normal to palpation  RESP: lungs clear to auscultation - no rales, rhonchi or wheezes  CV: regular rate and rhythm, normal S1 S2  ABDOMEN: soft, nontender, no hepatosplenomegaly, no masses and bowel sounds normal   (male): normal male genitalia without lesions or urethral discharge, no hernia  MS: no gross " musculoskeletal defects noted, no edema  SKIN: no suspicious lesions or rashes  NEURO: Normal strength and tone, mentation intact and speech normal  PSYCH: mentation appears normal, affect normal    Diagnostic Test Results:  Labs reviewed in Epic    ASSESSMENT/PLAN:     Routine general medical examination at a health care facility  - REVIEW OF HEALTH MAINTENANCE PROTOCOL ORDERS  - CBC with platelets; Future  - Comprehensive metabolic panel (BMP + Alb, Alk Phos, ALT, AST, Total. Bili, TP); Future    LLQ abdominal pain  - concern for inguinal hernia, ordered US for further evaluation   - US Hernia Evaluation; Future    Screening for HIV (human immunodeficiency virus)  - declined     Screen for colon cancer  - COLOGUARD(EXACT SCIENCES); Future    Screening for prostate cancer  - PSA, screen; Future    Lipid screening  - Lipid panel reflex to direct LDL Non-fasting; Future    Need for vaccination  - ZOSTER RECOMBINANT ADJUVANTED (SHINGRIX)  - RSV VACCINE (ABRYSVO)  - COVID-19 12+ (2023-24) (PFIZER)    Skin cancer screening  - Adult Dermatology  Referral; Future     Patient has been advised of split billing requirements and indicates understanding: Yes      COUNSELING:   Reviewed preventive health counseling, as reflected in patient instructions        He reports that he has never smoked. He has never used smokeless tobacco.            Christine Medina MD  [facility]

## 2023-11-14 LAB
ALBUMIN SERPL BCG-MCNC: 4.4 G/DL (ref 3.5–5.2)
ALP SERPL-CCNC: 98 U/L (ref 40–129)
ALT SERPL W P-5'-P-CCNC: 40 U/L (ref 0–70)
ANION GAP SERPL CALCULATED.3IONS-SCNC: 11 MMOL/L (ref 7–15)
AST SERPL W P-5'-P-CCNC: 36 U/L (ref 0–45)
BILIRUB SERPL-MCNC: 0.8 MG/DL
BUN SERPL-MCNC: 18.2 MG/DL (ref 8–23)
CALCIUM SERPL-MCNC: 9.6 MG/DL (ref 8.8–10.2)
CHLORIDE SERPL-SCNC: 103 MMOL/L (ref 98–107)
CHOLEST SERPL-MCNC: 184 MG/DL
CREAT SERPL-MCNC: 1.55 MG/DL (ref 0.67–1.17)
CREAT UR-MCNC: 189 MG/DL
DEPRECATED HCO3 PLAS-SCNC: 26 MMOL/L (ref 22–29)
EGFRCR SERPLBLD CKD-EPI 2021: 51 ML/MIN/1.73M2
GLUCOSE SERPL-MCNC: 90 MG/DL (ref 70–99)
HDLC SERPL-MCNC: 37 MG/DL
LDLC SERPL CALC-MCNC: 81 MG/DL
MICROALBUMIN UR-MCNC: 14.7 MG/L
MICROALBUMIN/CREAT UR: 7.78 MG/G CR (ref 0–17)
NONHDLC SERPL-MCNC: 147 MG/DL
POTASSIUM SERPL-SCNC: 4.4 MMOL/L (ref 3.4–5.3)
PROT SERPL-MCNC: 7.5 G/DL (ref 6.4–8.3)
PSA SERPL DL<=0.01 NG/ML-MCNC: 3.16 NG/ML (ref 0–4.5)
SODIUM SERPL-SCNC: 140 MMOL/L (ref 135–145)
TRIGL SERPL-MCNC: 329 MG/DL

## 2023-11-22 ENCOUNTER — HOSPITAL ENCOUNTER (OUTPATIENT)
Dept: ULTRASOUND IMAGING | Facility: CLINIC | Age: 63
Discharge: HOME | End: 2023-11-22
Attending: FAMILY MEDICINE | Admitting: FAMILY MEDICINE
Payer: COMMERCIAL

## 2023-11-22 DIAGNOSIS — R10.32 LLQ ABDOMINAL PAIN: ICD-10-CM

## 2023-11-22 PROCEDURE — 76705 ECHO EXAM OF ABDOMEN: CPT

## 2023-12-04 LAB — NONINV COLON CA DNA+OCC BLD SCRN STL QL: NEGATIVE

## 2024-06-27 ENCOUNTER — OFFICE VISIT (OUTPATIENT)
Dept: DERMATOLOGY | Facility: CLINIC | Age: 64
End: 2024-06-27
Attending: FAMILY MEDICINE
Payer: COMMERCIAL

## 2024-06-27 DIAGNOSIS — D22.9 MULTIPLE BENIGN MELANOCYTIC NEVI: Primary | ICD-10-CM

## 2024-06-27 DIAGNOSIS — L81.4 SOLAR LENTIGO: ICD-10-CM

## 2024-06-27 DIAGNOSIS — Z12.83 SKIN CANCER SCREENING: ICD-10-CM

## 2024-06-27 DIAGNOSIS — L82.1 SEBORRHEIC KERATOSIS: ICD-10-CM

## 2024-06-27 PROCEDURE — 99203 OFFICE O/P NEW LOW 30 MIN: CPT | Mod: GC | Performed by: DERMATOLOGY

## 2024-06-27 ASSESSMENT — PAIN SCALES - GENERAL: PAINLEVEL: NO PAIN (0)

## 2024-06-27 NOTE — LETTER
6/27/2024       RE: Sy Suaoz  [address]  [facility] [address]     Dear Colleague,    Thank you for referring your patient, Sy Suazo, to the [facility] [place] at [facility]. Please see a copy of my visit note below.    [facility] Dermatology Note  Encounter Date: Jun 27, 2024  Office Visit     Dermatology Problem List:  FBSE 6/27/24  # Benign appearing nevus on the L chest, 7-8 mm brown macule with patchy reticular pigment network on dermoscopy.   - Photo taken today   - Discussed sun protection strategies   #Seborrheic Keratoses, Cherry angiomas, solar lentigo, and benign nevi  - Reassured of benign etiology   ____________________________________________    Assessment & Plan:     # Benign appearing nevus on the L chest, 7-8 mm brown macule with patchy reticular pigment network on dermoscopy.   - Photo taken today    #Seborrheic Keratoses, Cherry angiomas, solar lentigo, and benign nevi  - Reassured of benign etiology   - Discussed sun protection strategies     Procedures Performed:   None    Follow-up: one year    Staff and Resident:    Nahomy Gill MD (PGY-3)  Dermatology Resident     I have seen and examined this patient and agree with the assessment and plan as documented in the resident's note.    Sy Baugh MD  Dermatology Attending    ____________________________________________    CC: Skin Check (Aden is here today for a skin check )    HPI:  Mr. Sy Suazo is a(n) 63 year old male who presents today as a new patient for skin check. He has no specific concerns today. No personal hx of skin cancer, family history of melanoma, or hx of organ transplant.    Patient is otherwise feeling well, without additional skin concerns.    Labs Reviewed:  N/A    Physical Exam:  Vitals: There were no vitals taken for this visit.  SKIN: Total skin excluding the undergarment areas was performed.  The exam included the head/face, neck, both arms, chest, back, abdomen, both legs, digits and/or nails.     - L chest, 7-8 mm brown macule with patchy reticular pigment network on dermoscopy.   - There are dome shaped bright red papules on the trunk.   - Multiple regular brown pigmented macules and papules are identified on the face, trunk and extremities.   - Scattered brown macules on sun exposed areas.  - There are waxy stuck-on appearing tan to brown papules on the back, trunk  - Previous site of skin cancer examined, no evidence of nodules or pigment within the scar  - No other lesions of concern on areas examined.     Medications:  No current outpatient medications on file.     No current facility-administered medications for this visit.      Past Medical History:   Patient Active Problem List   Diagnosis     Dizziness - light-headed     CARDIOVASCULAR SCREENING; LDL GOAL LESS THAN 130     Hypertension goal BP (blood pressure) < 140/90     Past Medical History:   Diagnosis Date     CARDIOVASCULAR SCREENING; LDL GOAL LESS THAN 130      Dizziness - light-headed     For 20 years     Hypertension goal BP (blood pressure) < 140/90        CC Christine Medina MD  [address]  [place] [address] on close of this encounter.       Again, thank you for allowing me to participate in the care of your patient.      Sincerely,    Sy Baugh MD

## 2024-06-27 NOTE — PATIENT INSTRUCTIONS
"The ABCDEs of Melanoma  Skin cancer can develop anywhere on the skin. Once a month, take a look at your entire body and note any changing moles or spots. Ask someone for help when checking your skin, especially for hard to see places such as your back. If you notice a mole that looks different from others, or one that changes, enlarges, itches, or bleeds, you should see a dermatologist.    Asymmetry, Border (irregularity), Color (not uniform, changes in color), Diameter (greater than 6 mm which is about the size of a pencil eraser), and Evolving (any changes in pre-existing moles). In short, look for the \"ugly duckling.\" You want all of the spots on your body to look like cousins (like they could be related). If something stands out, take a photo of it and make an appointment to have it evaluated.   "

## 2024-06-27 NOTE — PROGRESS NOTES
[facility] Dermatology Note  Encounter Date: Jun 27, 2024  Office Visit     Dermatology Problem List:  FBSE 6/27/24  # Benign appearing nevus on the L chest, 7-8 mm brown macule with patchy reticular pigment network on dermoscopy.   - Photo taken today   - Discussed sun protection strategies   #Seborrheic Keratoses, Cherry angiomas, solar lentigo, and benign nevi  - Reassured of benign etiology   ____________________________________________    Assessment & Plan:     # Benign appearing nevus on the L chest, 7-8 mm brown macule with patchy reticular pigment network on dermoscopy.   - Photo taken today    #Seborrheic Keratoses, Cherry angiomas, solar lentigo, and benign nevi  - Reassured of benign etiology   - Discussed sun protection strategies     Procedures Performed:   None    Follow-up: one year    Staff and Resident:    Nahomy Gill MD (PGY-3)  Dermatology Resident     I have seen and examined this patient and agree with the assessment and plan as documented in the resident's note.    Sy Baugh MD  Dermatology Attending    ____________________________________________    CC: Skin Check (Aden is here today for a skin check )    HPI:  Mr. Sy Suazo is a(n) 63 year old male who presents today as a new patient for skin check. He has no specific concerns today. No personal hx of skin cancer, family history of melanoma, or hx of organ transplant.    Patient is otherwise feeling well, without additional skin concerns.    Labs Reviewed:  N/A    Physical Exam:  Vitals: There were no vitals taken for this visit.  SKIN: Total skin excluding the undergarment areas was performed. The exam included the head/face, neck, both arms, chest, back, abdomen, both legs, digits and/or nails.     - L chest, 7-8 mm brown macule with patchy reticular pigment network on dermoscopy.   - There are dome shaped bright red papules on the trunk.   - Multiple regular brown pigmented macules and papules are  identified on the face, trunk and extremities.   - Scattered brown macules on sun exposed areas.  - There are waxy stuck-on appearing tan to brown papules on the back, trunk  - Previous site of skin cancer examined, no evidence of nodules or pigment within the scar  - No other lesions of concern on areas examined.     Medications:  No current outpatient medications on file.     No current facility-administered medications for this visit.      Past Medical History:   Patient Active Problem List   Diagnosis    Dizziness - light-headed    CARDIOVASCULAR SCREENING; LDL GOAL LESS THAN 130    Hypertension goal BP (blood pressure) < 140/90     Past Medical History:   Diagnosis Date    CARDIOVASCULAR SCREENING; LDL GOAL LESS THAN 130     Dizziness - light-headed     For 20 years    Hypertension goal BP (blood pressure) < 140/90        CC Christine Medina MD  [address]  [place] [address] on close of this encounter.

## 2024-06-27 NOTE — NURSING NOTE
Dermatology Rooming Note    Sy Suazo's goals for this visit include:   Chief Complaint   Patient presents with    Skin Check     Aden is here today for a skin check      ZANA Ocasio - Dermatology

## 2024-07-03 ENCOUNTER — MEDICAL CORRESPONDENCE (OUTPATIENT)
Dept: HEALTH INFORMATION MANAGEMENT | Facility: CLINIC | Age: 64
End: 2024-07-03
Payer: COMMERCIAL

## 2024-07-03 ENCOUNTER — TRANSFERRED RECORDS (OUTPATIENT)
Dept: HEALTH INFORMATION MANAGEMENT | Facility: CLINIC | Age: 64
End: 2024-07-03
Payer: COMMERCIAL

## 2024-07-05 ENCOUNTER — TRANSCRIBE ORDERS (OUTPATIENT)
Dept: OTHER | Age: 64
End: 2024-07-05

## 2024-07-05 DIAGNOSIS — S63.617A SPRAIN OF LEFT LITTLE FINGER: Primary | ICD-10-CM

## 2024-07-12 PROBLEM — D22.9 MULTIPLE BENIGN MELANOCYTIC NEVI: Status: ACTIVE | Noted: 2024-07-12

## 2024-07-12 PROBLEM — Z12.83 SKIN CANCER SCREENING: Status: ACTIVE | Noted: 2024-07-12
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
## 2024-07-16 ENCOUNTER — OFFICE VISIT (OUTPATIENT)
Dept: ORTHOPEDICS | Facility: CLINIC | Age: 64
End: 2024-07-16
Payer: COMMERCIAL

## 2024-07-16 VITALS — OXYGEN SATURATION: 100 % | DIASTOLIC BLOOD PRESSURE: 77 MMHG | SYSTOLIC BLOOD PRESSURE: 143 MMHG | HEART RATE: 75 BPM

## 2024-07-16 DIAGNOSIS — S63.637A SPRAIN OF INTERPHALANGEAL JOINT OF LEFT LITTLE FINGER, INITIAL ENCOUNTER: ICD-10-CM

## 2024-07-16 PROCEDURE — 99202 OFFICE O/P NEW SF 15 MIN: CPT | Performed by: ORTHOPAEDIC SURGERY

## 2024-07-16 ASSESSMENT — PAIN SCALES - GENERAL: PAINLEVEL: NO PAIN (0)
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
## 2024-10-14 ENCOUNTER — PATIENT OUTREACH (OUTPATIENT)
Dept: CARE COORDINATION | Facility: CLINIC | Age: 64
End: 2024-10-14
Payer: COMMERCIAL

## 2024-10-28 ENCOUNTER — PATIENT OUTREACH (OUTPATIENT)
Dept: CARE COORDINATION | Facility: CLINIC | Age: 64
End: 2024-10-28
Payer: COMMERCIAL

## 2024-12-08 ENCOUNTER — MYC MEDICAL ADVICE (OUTPATIENT)
Dept: FAMILY MEDICINE | Facility: CLINIC | Age: 64
End: 2024-12-08
Payer: COMMERCIAL

## 2024-12-17 NOTE — TELEPHONE ENCOUNTER
MEDICAL RECORDS REQUEST   [facility]  Urology Clinic  [address] [place] [address]  PHONE: [phone]  Fax: [phone]        FUTURE VISIT INFORMATION                                                   Sy Suazo, : 1960 scheduled for future visit at [facility]    APPOINTMENT INFORMATION:  Date: 2025  Provider:  Segundo Ambrosio PA-C  Reason for Visit/Diagnosis: ELEVATED PSA    REFERRAL INFORMATION:  Referring provider:  Christine Medina MD in [facility] P[facility]      RECORDS REQUESTED FOR VISIT                                                     NOTES  STATUS/DETAILS   OFFICE NOTE from referring provider  yes, 2024,  2024 -- Christine Medina MD in [facility]   MEDICATION LIST  yes   LABS     PSA  yes     PRE-VISIT CHECKLIST      Joint diagnostic appointment coordinated correctly          (ensure right order & amount of time) Yes   RECORD COLLECTION COMPLETE Yes

## 2025-02-24 NOTE — PROGRESS NOTES
Subjective     REFERRAL SOURCE  The patient is being seen in consultation at the request of Christine Medina MD  [address]  [place] [address]    REASON FOR CONSULT  Elevated PSA    HISTORY OF PRESENT ILLNESS  Mr. Suazo is a pleasant 64 year old male who presents today for further evaluation and management of his elevated PSA. His past medical history is significant for hypertension, hyperlipidemia, and BPH with nocturia.  I personally reviewed the family practice visit note from 12/13/2024 in preparation for today's visit.    Today:  Bothersome nocturia not well controlled with Flomax over the last two months   Struggling with nocturia for 20 years  No history of urinary retention   No history of UTIs or prostatitis   Chronic intermittent left testicular pain  Had to be evaluated by the ER for an episode of pelvic/bladder pain that was significant   No history of gross hematuria     REVIEW OF SYSTEMS  Review of Systems   Constitutional:  Negative for fatigue and unexpected weight change.   HENT:  Negative for hearing loss.    Eyes:  Negative for visual disturbance.   Respiratory:  Negative for shortness of breath.    Cardiovascular:  Negative for chest pain.   Gastrointestinal:  Negative for abdominal pain.   Genitourinary:  Negative for hematuria.   Musculoskeletal:  Negative for back pain.   Neurological:  Negative for dizziness and light-headedness.   Hematological:  Negative for adenopathy.   Psychiatric/Behavioral:  Positive for sleep disturbance.        SOCIAL HISTORY  Denies any history of or current smoking     FAMILY HISTORY  Denies any known family history of urologic malignancy     RELEVANT SURGICAL HISTORY  No history of lower abdominal surgery     Objective     PHYSICAL EXAMINATION  General: Well-appearing male in no acute apparent distress.  Neuro: Alert and orientated x3.  Eyes: Anictric  Head: Normocephalic  Psych: Maintained eye contact throughout conversation.  Skin: No lesions  in the areas examined.  Lungs: Normal respiratory effort upon inspiration.  Musculoskeletal: Normal gait noted with ambulation.  Abdomen: Appears nondistended.  Rectal: Rectal exam recently completed by his primary care which did not show any evidence of nodularity or induration or tenderness    LABS  Lab Results   Component Value Date    PSA 8.48 (H) 12/06/2024    PSA 3.16 11/13/2023    PSA 1.74 09/04/2019       Latest Reference Range & Units 12/06/24 07:56   Color Urine Colorless, Straw, Light Yellow, Yellow  Yellow   Appearance Urine Clear  Clear   Glucose Urine Negative mg/dL Negative   Bilirubin Urine Negative  Negative   Ketones Urine Negative mg/dL Negative   Specific Gravity Urine 1.003 - 1.035  1.025   pH Urine 5.0 - 7.0  5.5   Protein Albumin Urine Negative mg/dL Negative   Urobilinogen Urine 0.2, 1.0 E.U./dL 0.2   Nitrite Urine Negative  Negative   Blood Urine Negative  Negative   Leukocyte Esterase Urine Negative  Negative     IMPRESSION  Elevated PSA  Chronic nocturia  Pelvic pain    It was my pleasure speaking with Mr. Suazo today for discussion of his elevated PSA. We first discussed the etiologies of elevated PSA, including infection, inflammation, ejaculation prior to sampling, BPH, recent perineal trauma or catheterization, versus malignancy. We then discussed the natural history of prostate cancer and how it shapes our prostate cancer screening. Finally, we reviewed Mr. Suazo current and previous PSA's and discussed that his PSA baseline appeared to previously be somewhere in the realm of 2 in the early 2020s, but more recently had a spike in a years time up to 8.  This represents an isolated elevated PSA, meaning my main recommendation is to first repeat a PSA given his benign digital rectal exam with his primary first available to determine if this elevation is real.    In regards to his longstanding intermittent left testicular discomfort with his recent episode of worsening pelvic discomfort  requiring a trip for evaluation, we discussed that this has a few different potential etiologies, but I am slightly in favor of this being a chronic pelvic floor dysfunction situation.  Given that the pain is always on the left-hand side, I do believe we should get a first available testicular ultrasound, but would like to keep the idea of pelvic floor physical therapy in mind.  Ultimately it sounds like as long as this does not worsen he does not believe this would need to be intervened upon which is good to know.    In regards to his longstanding nocturia, I discussed with him that I do not believe that this is due to BPH, but rather a sleep issue as it is very common with patients IC.  My initial thought is that he should try an over-the-counter sleep medication like Unisom, or doxylamine succinate, to help him stay asleep and fall back to sleep much easier.  I want him to keep me posted over [email or web address] with the efficacy of this.  We will schedule a separate visit in the future if needed to discuss other interventions possible.    Mr. Suazo expressed understanding and agreement to the above discussion and plan and all of his questions were answered to his satisfaction.    PLAN  First available PSA   First available testicular ultrasound   Home trial of Unisom     Signed by:    Segundo Ambrosio PA-C    I spent a total of 55 minutes spent on the date of the encounter doing chart review, history and exam, documentation, and further activities as noted above.

## 2025-02-25 ENCOUNTER — OFFICE VISIT (OUTPATIENT)
Dept: UROLOGY | Facility: CLINIC | Age: 65
End: 2025-02-25
Attending: STUDENT IN AN ORGANIZED HEALTH CARE EDUCATION/TRAINING PROGRAM
Payer: COMMERCIAL

## 2025-02-25 ENCOUNTER — PRE VISIT (OUTPATIENT)
Dept: UROLOGY | Facility: CLINIC | Age: 65
End: 2025-02-25

## 2025-02-25 VITALS — HEART RATE: 66 BPM | SYSTOLIC BLOOD PRESSURE: 142 MMHG | DIASTOLIC BLOOD PRESSURE: 78 MMHG | OXYGEN SATURATION: 98 %

## 2025-02-25 DIAGNOSIS — N50.812 PAIN IN LEFT TESTICLE: ICD-10-CM

## 2025-02-25 DIAGNOSIS — R97.20 ELEVATED PROSTATE SPECIFIC ANTIGEN (PSA): Primary | ICD-10-CM

## 2025-02-25 DIAGNOSIS — R35.1 NOCTURIA: ICD-10-CM

## 2025-02-25 PROCEDURE — 99245 OFF/OP CONSLTJ NEW/EST HI 55: CPT | Performed by: STUDENT IN AN ORGANIZED HEALTH CARE EDUCATION/TRAINING PROGRAM

## 2025-02-25 ASSESSMENT — ENCOUNTER SYMPTOMS
BACK PAIN: 0
FATIGUE: 0
ABDOMINAL PAIN: 0
LIGHT-HEADEDNESS: 0
UNEXPECTED WEIGHT CHANGE: 0
SLEEP DISTURBANCE: 1
ADENOPATHY: 0
HEMATURIA: 0
DIZZINESS: 0
SHORTNESS OF BREATH: 0

## 2025-02-25 ASSESSMENT — PAIN SCALES - GENERAL: PAINLEVEL_OUTOF10: NO PAIN (0)

## 2025-02-25 NOTE — LETTER
2/25/2025       RE: Sy Suazo  [address]  [facility] [address]     Dear Colleague,    Thank you for referring your patient, Sy Suazo, to the [facility] [place] at [facility]. Please see a copy of my visit note below.    Subjective    REFERRAL SOURCE  The patient is being seen in consultation at the request of Christine Medina MD  [address]  [place] [address]    REASON FOR CONSULT  Elevated PSA    HISTORY OF PRESENT ILLNESS  Mr. Suazo is a pleasant 64 year old male who presents today for further evaluation and management of his elevated PSA. His past medical history is significant for hypertension, hyperlipidemia, and BPH with nocturia.  I personally reviewed the family practice visit note from 12/13/2024 in preparation for today's visit.    Today:  Bothersome nocturia not well controlled with Flomax over the last two months   Struggling with nocturia for 20 years  No history of urinary retention   No history of UTIs or prostatitis   Chronic intermittent left testicular pain  Had to be evaluated by the ER for an episode of pelvic/bladder pain that was significant   No history of gross hematuria     REVIEW OF SYSTEMS  Review of Systems   Constitutional:  Negative for fatigue and unexpected weight change.   HENT:  Negative for hearing loss.    Eyes:  Negative for visual disturbance.   Respiratory:  Negative for shortness of breath.    Cardiovascular:  Negative for chest pain.   Gastrointestinal:  Negative for abdominal pain.   Genitourinary:  Negative for hematuria.   Musculoskeletal:  Negative for back pain.   Neurological:  Negative for dizziness and light-headedness.   Hematological:  Negative for adenopathy.   Psychiatric/Behavioral:  Positive for sleep disturbance.        SOCIAL HISTORY  Denies any history of or current smoking     FAMILY HISTORY  Denies any known family history of urologic malignancy      RELEVANT SURGICAL HISTORY  No history of lower abdominal surgery     Objective    PHYSICAL EXAMINATION  General: Well-appearing male in no acute apparent distress.  Neuro: Alert and orientated x3.  Eyes: Anictric  Head: Normocephalic  Psych: Maintained eye contact throughout conversation.  Skin: No lesions in the areas examined.  Lungs: Normal respiratory effort upon inspiration.  Musculoskeletal: Normal gait noted with ambulation.  Abdomen: Appears nondistended.  Rectal: Rectal exam recently completed by his primary care which did not show any evidence of nodularity or induration or tenderness    LABS  Lab Results   Component Value Date    PSA 8.48 (H) 12/06/2024    PSA 3.16 11/13/2023    PSA 1.74 09/04/2019       Latest Reference Range & Units 12/06/24 07:56   Color Urine Colorless, Straw, Light Yellow, Yellow  Yellow   Appearance Urine Clear  Clear   Glucose Urine Negative mg/dL Negative   Bilirubin Urine Negative  Negative   Ketones Urine Negative mg/dL Negative   Specific Gravity Urine 1.003 - 1.035  1.025   pH Urine 5.0 - 7.0  5.5   Protein Albumin Urine Negative mg/dL Negative   Urobilinogen Urine 0.2, 1.0 E.U./dL 0.2   Nitrite Urine Negative  Negative   Blood Urine Negative  Negative   Leukocyte Esterase Urine Negative  Negative     IMPRESSION  Elevated PSA  Chronic nocturia  Pelvic pain    It was my pleasure speaking with Mr. Suazo today for discussion of his elevated PSA. We first discussed the etiologies of elevated PSA, including infection, inflammation, ejaculation prior to sampling, BPH, recent perineal trauma or catheterization, versus malignancy. We then discussed the natural history of prostate cancer and how it shapes our prostate cancer screening. Finally, we reviewed Mr. Suazo current and previous PSA's and discussed that his PSA baseline appeared to previously be somewhere in the realm of 2 in the early 2020s, but more recently had a spike in a years time up to 8.  This represents an  isolated elevated PSA, meaning my main recommendation is to first repeat a PSA given his benign digital rectal exam with his primary first available to determine if this elevation is real.    In regards to his longstanding intermittent left testicular discomfort with his recent episode of worsening pelvic discomfort requiring a trip for evaluation, we discussed that this has a few different potential etiologies, but I am slightly in favor of this being a chronic pelvic floor dysfunction situation.  Given that the pain is always on the left-hand side, I do believe we should get a first available testicular ultrasound, but would like to keep the idea of pelvic floor physical therapy in mind.  Ultimately it sounds like as long as this does not worsen he does not believe this would need to be intervened upon which is good to know.    In regards to his longstanding nocturia, I discussed with him that I do not believe that this is due to BPH, but rather a sleep issue as it is very common with patients IC.  My initial thought is that he should try an over-the-counter sleep medication like Unisom, or doxylamine succinate, to help him stay asleep and fall back to sleep much easier.  I want him to keep me posted over [employer] with the efficacy of this.  We will schedule a separate visit in the future if needed to discuss other interventions possible.    Mr. Suazo expressed understanding and agreement to the above discussion and plan and all of his questions were answered to his satisfaction.    PLAN  First available PSA   First available testicular ultrasound   Home trial of Unisom     Signed by:    Segundo Ambrosio PA-C    I spent a total of 55 minutes spent on the date of the encounter doing chart review, history and exam, documentation, and further activities as noted above.       Again, thank you for allowing me to participate in the care of your patient.      Sincerely,    Segundo Ambrosio PA-C

## 2025-02-25 NOTE — NURSING NOTE
Sy Suazo is a 64 year old male patient that presents today in clinic for the following:    Chief Complaint   Patient presents with    Follow Up     Elevated PSA        The patient's allergies and medications were reviewed as noted. A set of vitals were recorded as noted without incident. The patient does not have any other questions for the provider.    Blood pressure (!) 142/78, pulse 66, SpO2 98%. There is no height or weight on file to calculate BMI.    Patient Active Problem List   Diagnosis    Dizziness - light-headed    CARDIOVASCULAR SCREENING; LDL GOAL LESS THAN 130    Hypertension goal BP (blood pressure) < 140/90    Skin cancer screening    Multiple benign melanocytic nevi       No Known Allergies    Current Outpatient Medications   Medication Sig Dispense Refill    simvastatin (ZOCOR) 20 MG tablet Take 1 tablet (20 mg) by mouth at bedtime. 90 tablet 3    tamsulosin (FLOMAX) 0.4 MG capsule Take 1 capsule (0.4 mg) by mouth daily. 90 capsule 3       Social History     Tobacco Use    Smoking status: Never     Passive exposure: Never    Smokeless tobacco: Never   Vaping Use    Vaping status: Never Used   Substance Use Topics    Alcohol use: Yes     Comment: 1 drink a month    Drug use: No       Carolynn Medina LPN  2/25/2025  8:59 AM

## 2025-02-27 ENCOUNTER — LAB (OUTPATIENT)
Dept: LAB | Facility: CLINIC | Age: 65
End: 2025-02-27
Payer: COMMERCIAL

## 2025-02-27 ENCOUNTER — ANCILLARY PROCEDURE (OUTPATIENT)
Dept: ULTRASOUND IMAGING | Facility: CLINIC | Age: 65
End: 2025-02-27
Attending: STUDENT IN AN ORGANIZED HEALTH CARE EDUCATION/TRAINING PROGRAM
Payer: COMMERCIAL

## 2025-02-27 DIAGNOSIS — R97.20 ELEVATED PROSTATE SPECIFIC ANTIGEN (PSA): ICD-10-CM

## 2025-02-27 DIAGNOSIS — N50.812 PAIN IN LEFT TESTICLE: ICD-10-CM

## 2025-02-27 LAB — PSA SERPL DL<=0.01 NG/ML-MCNC: 5.67 NG/ML (ref 0–4.5)

## 2025-02-27 PROCEDURE — 36415 COLL VENOUS BLD VENIPUNCTURE: CPT | Performed by: PATHOLOGY

## 2025-02-27 PROCEDURE — 84153 ASSAY OF PSA TOTAL: CPT | Performed by: PATHOLOGY

## 2025-03-03 ENCOUNTER — MYC MEDICAL ADVICE (OUTPATIENT)
Dept: UROLOGY | Facility: CLINIC | Age: 65
End: 2025-03-03
Payer: COMMERCIAL

## 2025-03-03 DIAGNOSIS — R97.20 ELEVATED PROSTATE SPECIFIC ANTIGEN (PSA): Primary | ICD-10-CM

## 2025-03-03 NOTE — TELEPHONE ENCOUNTER
Clinic coordinators, would you please assist me in contacting this patient to set up a repeat PSA in 4 to 5 weeks?  Thank you!

## 2025-03-05 NOTE — TELEPHONE ENCOUNTER
Patient confirmed scheduled appointment:  Date: 4/2  Time: 12:30pm  Visit type: lab  Provider: lab  Location: [facility]  Testing/imaging: PSA Lab

## 2025-04-02 ENCOUNTER — LAB (OUTPATIENT)
Dept: LAB | Facility: CLINIC | Age: 65
End: 2025-04-02
Payer: COMMERCIAL

## 2025-04-02 DIAGNOSIS — R97.20 ELEVATED PROSTATE SPECIFIC ANTIGEN (PSA): ICD-10-CM

## 2025-04-02 LAB — PSA SERPL DL<=0.01 NG/ML-MCNC: 3.16 NG/ML (ref 0–4.5)

## 2025-04-02 PROCEDURE — 84153 ASSAY OF PSA TOTAL: CPT | Performed by: PATHOLOGY

## 2025-04-02 PROCEDURE — 36415 COLL VENOUS BLD VENIPUNCTURE: CPT | Performed by: PATHOLOGY
